# Patient Record
Sex: MALE | Race: WHITE | NOT HISPANIC OR LATINO | Employment: FULL TIME | URBAN - NONMETROPOLITAN AREA
[De-identification: names, ages, dates, MRNs, and addresses within clinical notes are randomized per-mention and may not be internally consistent; named-entity substitution may affect disease eponyms.]

---

## 2018-04-10 ENCOUNTER — HISTORICAL (OUTPATIENT)
Dept: ADMINISTRATIVE | Facility: HOSPITAL | Age: 53
End: 2018-04-10

## 2018-05-30 ENCOUNTER — HISTORICAL (OUTPATIENT)
Dept: ADMINISTRATIVE | Facility: HOSPITAL | Age: 53
End: 2018-05-30

## 2022-07-21 ENCOUNTER — HOSPITAL ENCOUNTER (EMERGENCY)
Facility: HOSPITAL | Age: 57
Discharge: HOME | End: 2022-07-21
Attending: EMERGENCY MEDICINE

## 2022-07-21 VITALS
OXYGEN SATURATION: 98 % | DIASTOLIC BLOOD PRESSURE: 88 MMHG | WEIGHT: 170 LBS | BODY MASS INDEX: 23.8 KG/M2 | TEMPERATURE: 98 F | HEART RATE: 73 BPM | SYSTOLIC BLOOD PRESSURE: 134 MMHG | RESPIRATION RATE: 15 BRPM | HEIGHT: 71 IN

## 2022-07-21 DIAGNOSIS — T15.92XA FOREIGN BODY, EYE, LEFT, INITIAL ENCOUNTER: Primary | ICD-10-CM

## 2022-07-21 PROCEDURE — 99283 EMERGENCY DEPT VISIT LOW MDM: CPT | Mod: 25,ER

## 2022-07-21 PROCEDURE — 25000003 PHARM REV CODE 250: Mod: ER | Performed by: NURSE PRACTITIONER

## 2022-07-21 PROCEDURE — 65205 REMOVE FOREIGN BODY FROM EYE: CPT | Mod: LT,ER

## 2022-07-21 RX ORDER — TETRACAINE HYDROCHLORIDE 5 MG/ML
2 SOLUTION OPHTHALMIC
Status: COMPLETED | OUTPATIENT
Start: 2022-07-21 | End: 2022-07-21

## 2022-07-21 RX ORDER — ACETAMINOPHEN 500 MG
1000 TABLET ORAL EVERY 8 HOURS PRN
Qty: 20 TABLET | Refills: 0 | Status: SHIPPED | OUTPATIENT
Start: 2022-07-21

## 2022-07-21 RX ORDER — ERYTHROMYCIN 5 MG/G
OINTMENT OPHTHALMIC EVERY 6 HOURS
Qty: 1 G | Refills: 0 | Status: SHIPPED | OUTPATIENT
Start: 2022-07-21 | End: 2022-07-26

## 2022-07-21 RX ORDER — ERYTHROMYCIN 5 MG/G
OINTMENT OPHTHALMIC
Status: COMPLETED | OUTPATIENT
Start: 2022-07-21 | End: 2022-07-21

## 2022-07-21 RX ORDER — HYDROCODONE BITARTRATE AND ACETAMINOPHEN 5; 325 MG/1; MG/1
1 TABLET ORAL
Status: COMPLETED | OUTPATIENT
Start: 2022-07-21 | End: 2022-07-21

## 2022-07-21 RX ORDER — ASPIRIN 325 MG
325 TABLET ORAL
COMMUNITY

## 2022-07-21 RX ORDER — IBUPROFEN 600 MG/1
600 TABLET ORAL EVERY 6 HOURS PRN
Qty: 20 TABLET | Refills: 0 | Status: SHIPPED | OUTPATIENT
Start: 2022-07-21

## 2022-07-21 RX ADMIN — TETRACAINE HYDROCHLORIDE 2 DROP: 5 SOLUTION OPHTHALMIC at 10:07

## 2022-07-21 RX ADMIN — HYDROCODONE BITARTRATE AND ACETAMINOPHEN 1 TABLET: 5; 325 TABLET ORAL at 10:07

## 2022-07-21 RX ADMIN — FLUORESCEIN SODIUM 1 EACH: 1 STRIP OPHTHALMIC at 10:07

## 2022-07-21 RX ADMIN — ERYTHROMYCIN 1 INCH: 5 OINTMENT OPHTHALMIC at 11:07

## 2022-07-21 NOTE — DISCHARGE INSTRUCTIONS
Follow-up with ophthalmology.  Return to the emergency room for any new or worsening symptoms such as changes to your vision, pain, redness or swelling around the eye painful eye movements, or any other concerns.

## 2022-07-21 NOTE — FIRST PROVIDER EVALUATION
Emergency Department TeleTriage Encounter Note      CHIEF COMPLAINT    Chief Complaint   Patient presents with    Foreign Body in Eye     Was cutting plywood, possible debris in left eye, vision blurry to eye       VITAL SIGNS   Initial Vitals [07/21/22 1022]   BP Pulse Resp Temp SpO2   (!) 126/92 84 20 97.8 °F (36.6 °C) 97 %      MAP       --            ALLERGIES    Review of patient's allergies indicates:  No Known Allergies    PROVIDER TRIAGE NOTE  This is a teletriage evaluation of a 57 y.o. male presenting to the ED complaining of foreign body in eye. Patient thinks he has a piece of wood in his eye. Blurry vision and increased tearing reported.    Patient holding left eye during teletriage. Does not appear red.      Initial orders will be placed and care will be transferred to an alternate provider when patient is roomed for a full evaluation. Any additional orders and the final disposition will be determined by that provider.           ORDERS  Labs Reviewed - No data to display    ED Orders (720h ago, onward)    Start Ordered     Status Ordering Provider    07/21/22 1034 07/21/22 1033  Visual acuity screening  Once         Ordered ANDREA FNIE            Virtual Visit Note: The provider triage portion of this emergency department evaluation and documentation was performed via [employer], a HIPAA-compliant telemedicine application, in concert with a tele-presenter in the room. A face to face patient evaluation with one of my colleagues will occur once the patient is placed in an emergency department room.      DISCLAIMER: This note was prepared with [employer] voice recognition transcription software. Garbled syntax, mangled pronouns, and other bizarre constructions may be attributed to that software system.

## 2022-07-21 NOTE — ED PROVIDER NOTES
Encounter Date: 7/21/2022    SCRIBE #1 NOTE: I, Angélica Carlton, am scribing for, and in the presence of,  Sulema Fatima NP. I have scribed the following portions of the note - Other sections scribed: HPI, ROS.       History     Chief Complaint   Patient presents with    Foreign Body in Eye     Was cutting plywood, possible debris in left eye, vision blurry to eye     57 y.o. male with no pertinent medical history who presents to the ER with chief complaint of acute left eye pain and blurry vision after debris flew into eye around 9 AM. Patient was drilling screws into plywood above his head when he saw a piece fly towards his eye. He was wearing glasses, but no contacts or other protective eyewear. Denies other associated symptoms. Endorses tetanus in the last 4 years. NKDA.    The history is provided by the patient. No  was used.     Review of patient's allergies indicates:  No Known Allergies  No past medical history on file.  Past Surgical History:   Procedure Laterality Date    HERNIA REPAIR       No family history on file.  Social History     Tobacco Use    Smoking status: Current Some Day Smoker     Types: Cigarettes    Smokeless tobacco: Current User     Types: Snuff, Chew   Substance Use Topics    Alcohol use: Yes     Comment: beer daily 24 oz    Drug use: No     Review of Systems   Constitutional: Negative for fever.   HENT: Negative for sore throat.    Eyes: Positive for pain (L) and visual disturbance.   Respiratory: Negative for shortness of breath.    Cardiovascular: Negative for chest pain.   Gastrointestinal: Negative for diarrhea and vomiting.   Genitourinary: Negative for dysuria.   Musculoskeletal: Negative for neck pain.   Skin: Negative for rash.   All other systems reviewed and are negative.      Physical Exam     Initial Vitals [07/21/22 1022]   BP Pulse Resp Temp SpO2   (!) 126/92 84 20 97.8 °F (36.6 °C) 97 %      MAP       --         Physical Exam    Vitals  reviewed.  Constitutional: He appears well-developed and well-nourished. He is not diaphoretic.  Non-toxic appearance. He does not have a sickly appearance. He does not appear ill. No distress.   HENT:   Head: Normocephalic and atraumatic.   Right Ear: External ear normal.   Left Ear: External ear normal.   Eyes: Conjunctivae, EOM and lids are normal. Pupils are equal, round, and reactive to light. Right eye exhibits no chemosis and no discharge. No foreign body present in the right eye. Foreign body present in the left eye.   Slit lamp exam:       The right eye shows no corneal ulcer.        The left eye shows foreign body. The left eye shows no corneal abrasion, no corneal ulcer and no fluorescein uptake.   Eye History: he does not wear contact lenses and does wear glasses.  Visual Acuity: OD: 20/30; OS: 20/50; OU: 20/20.  Intraocular Pressure: OD: 18/15; OS: 17/16.  Physical Exam:   OS: Pupil equal, round, and reactive to light with EOM's intact in all directions. There is no photophobia with direct light. There was no fluroescein uptake on Wood's Lamp exam and a Negative Kayla's sign. There is no corneal abrasion, conjunctival abrasion, dendritic lesion, hyphema, or subconjunctival hemorrhage. There was a wooden foreign body identified under the upper left eye lid with eversion. Removed with cotton swab without difficulty.    Neck:   Normal range of motion.  Pulmonary/Chest: No respiratory distress.   Musculoskeletal:         General: Normal range of motion.      Cervical back: Normal range of motion.     Neurological: He is alert and oriented to person, place, and time. GCS eye subscore is 4. GCS verbal subscore is 5. GCS motor subscore is 6.   Skin: Skin is warm, dry and intact. No rash noted. No erythema.   Psychiatric: He has a normal mood and affect. Thought content normal.         ED Course   Foreign Body    Date/Time: 7/21/2022 5:50 PM  Performed by: Sulema Winters NP  Authorized by: Hilary Quintero MD    Body area: eye  Location details: left eyelid  Localization method: eyelid eversion  Removal mechanism: eyelid eversion and moist cotton swab  Eye examined with fluorescein.  Corneal abrasion size: none.  Corneal abrasion location: none.  No residual rust ring present.  Dressing: antibiotic ointment  Depth: superficial  Complexity: simple  1 objects recovered.  Objects recovered: wooden fb  Post-procedure assessment: foreign body removed  Patient tolerance: Patient tolerated the procedure well with no immediate complications      Labs Reviewed - No data to display       Imaging Results    None          Medications   fluorescein ophthalmic strip 1 each (1 each Both Eyes Given 7/21/22 1047)   TETRAcaine HCl (PF) 0.5 % Drop 2 drop (2 drops Both Eyes Given 7/21/22 1047)   HYDROcodone-acetaminophen 5-325 mg per tablet 1 tablet (1 tablet Oral Given 7/21/22 1047)   erythromycin 5 mg/gram (0.5 %) ophthalmic ointment (1 inch Left Eye Given 7/21/22 1114)     Medical Decision Making:   History:   Old Medical Records: I decided to obtain old medical records.  ED Management:  57-year-old male presenting to the ED with left eye pain and foreign body sensation.  Full physical exam as above.  Wooden foreign body noted with eyelid eversion of the left upper eyelid.  Removed without difficulty.  Eye was examined with fluorescein without any uptake concerning for corneal abrasion.  I have observed no other foreign bodies or eye injury on exam.  Pain has resolved.  Intra-ocular pressures are reassuring.  I will discharge him home with erythromycin ointment.  His tetanus is up-to-date.  He will follow-up with ophthalmology.  Strict return precautions discussed with the patient who verbalized understanding.          Scribe Attestation:   Scribe #1: I performed the above scribed service and the documentation accurately describes the services I performed. I attest to the accuracy of the note.                Scribe attestation: ADÁN YEAGER  Vianey personally performed the services described in this documentation. All medical record entries made by the scribe were at my direction and in my presence.  I have reviewed the chart and agree that the record reflects my personal performance and is accurate and complete.  Clinical Impression:   Final diagnoses:  [T15.92XA] Foreign body, eye, left, initial encounter (Primary)          ED Disposition Condition    Discharge Stable        ED Prescriptions     Medication Sig Dispense Start Date End Date Auth. Provider    erythromycin (ROMYCIN) ophthalmic ointment Place into the left eye every 6 (six) hours. Place a 1/2 inch ribbon of ointment into the lower eyelid. for 5 days 1 g 7/21/2022 7/26/2022 Sulema Winters NP    ibuprofen (ADVIL,MOTRIN) 600 MG tablet Take 1 tablet (600 mg total) by mouth every 6 (six) hours as needed for Pain. 20 tablet 7/21/2022  Sulema Winters NP    acetaminophen (TYLENOL) 500 MG tablet Take 2 tablets (1,000 mg total) by mouth every 8 (eight) hours as needed for Pain. 20 tablet 7/21/2022  Sulema Winters NP        Follow-up Information     Follow up With Specialties Details Why Contact Info    Colton Vee MD Ophthalmology Schedule an appointment as soon as possible for a visit in 1 day  [address] [facility] [address]  [phone]      [facility] Emergency Medicine Go to  If symptoms worsen [address] [facility] [id number] [phone]           Sulema Winters NP  07/21/22 6488